# Patient Record
Sex: FEMALE | Race: WHITE | NOT HISPANIC OR LATINO | URBAN - METROPOLITAN AREA
[De-identification: names, ages, dates, MRNs, and addresses within clinical notes are randomized per-mention and may not be internally consistent; named-entity substitution may affect disease eponyms.]

---

## 2019-01-23 ENCOUNTER — EMERGENCY (EMERGENCY)
Facility: HOSPITAL | Age: 69
LOS: 1 days | Discharge: HOME | End: 2019-01-23
Attending: EMERGENCY MEDICINE
Payer: COMMERCIAL

## 2019-01-23 VITALS
HEART RATE: 76 BPM | TEMPERATURE: 98 F | WEIGHT: 115.08 LBS | DIASTOLIC BLOOD PRESSURE: 67 MMHG | SYSTOLIC BLOOD PRESSURE: 131 MMHG | OXYGEN SATURATION: 97 % | HEIGHT: 64 IN | RESPIRATION RATE: 20 BRPM

## 2019-01-23 PROCEDURE — 99283 EMERGENCY DEPT VISIT LOW MDM: CPT | Mod: 25

## 2019-01-23 PROCEDURE — 90715 TDAP VACCINE 7 YRS/> IM: CPT

## 2019-01-23 PROCEDURE — 90471 IMMUNIZATION ADMIN: CPT

## 2019-01-23 PROCEDURE — 12001 RPR S/N/AX/GEN/TRNK 2.5CM/<: CPT

## 2019-01-23 PROCEDURE — 99282 EMERGENCY DEPT VISIT SF MDM: CPT | Mod: 25

## 2019-01-23 RX ORDER — TETANUS TOXOID, REDUCED DIPHTHERIA TOXOID AND ACELLULAR PERTUSSIS VACCINE, ADSORBED 5; 2.5; 8; 8; 2.5 [IU]/.5ML; [IU]/.5ML; UG/.5ML; UG/.5ML; UG/.5ML
0.5 SUSPENSION INTRAMUSCULAR ONCE
Qty: 0 | Refills: 0 | Status: COMPLETED | OUTPATIENT
Start: 2019-01-23 | End: 2019-01-23

## 2019-01-23 RX ADMIN — TETANUS TOXOID, REDUCED DIPHTHERIA TOXOID AND ACELLULAR PERTUSSIS VACCINE, ADSORBED 0.5 MILLILITER(S): 5; 2.5; 8; 8; 2.5 SUSPENSION INTRAMUSCULAR at 21:10

## 2019-01-23 NOTE — ED PROVIDER NOTE - ATTENDING CONTRIBUTION TO CARE
seen and evaluated with acp  presents to ed for laceration  no loc, nausea, vomitng, neck pain  looks well, no distress, awake and alert  exam as documented  irrigate, simple closure  dt if not utd  f/u

## 2019-01-23 NOTE — ED PROVIDER NOTE - OBJECTIVE STATEMENT
67 Y/o female PMh of HLD present in Er S/p hit the ramos of the head as she was about stand up to the [place]org[place] of the TV , denies any headache- dizziness- visual changes - weakness- N/V - difficulty walking or any other compliant   denies taking any forms of blood thinner meds    unknown by pt when was the last tenus injection

## 2019-01-23 NOTE — ED PROVIDER NOTE - PHYSICAL EXAMINATION
scalp : left occipito - parietal area with 0.5cm la noted edge of the wound is open an bleeding on control- no depressed scalp  on palpating noted

## 2019-01-23 NOTE — ED ADULT NURSE NOTE - OBJECTIVE STATEMENT
pt A&Ox4 states that she hit head on corner of a TV pt denies loc, dizziness, blurred vision, n/v. blood thinners, pt has small lac to right side back of head with bleeding controlled

## 2022-12-22 ENCOUNTER — OFFICE (OUTPATIENT)
Dept: URBAN - METROPOLITAN AREA CLINIC 1 | Facility: CLINIC | Age: 72
Setting detail: OPHTHALMOLOGY
End: 2022-12-22
Payer: MEDICARE

## 2022-12-22 DIAGNOSIS — H02.831: ICD-10-CM

## 2022-12-22 DIAGNOSIS — H25.13: ICD-10-CM

## 2022-12-22 DIAGNOSIS — H43.813: ICD-10-CM

## 2022-12-22 DIAGNOSIS — H02.834: ICD-10-CM

## 2022-12-22 DIAGNOSIS — H11.153: ICD-10-CM

## 2022-12-22 DIAGNOSIS — H43.393: ICD-10-CM

## 2022-12-22 PROCEDURE — 92014 COMPRE OPH EXAM EST PT 1/>: CPT | Performed by: OPHTHALMOLOGY

## 2022-12-22 ASSESSMENT — AXIALLENGTH_DERIVED
OD_AL: 22.0022
OS_AL: 22.2544
OS_AL: 22.7869
OS_AL: 22.7869
OD_AL: 22.7921
OD_AL: 22.7921

## 2022-12-22 ASSESSMENT — REFRACTION_MANIFEST
OD_CYLINDER: -0.75
OD_SPHERE: +4.00
OS_CYLINDER: +0.75
OS_SPHERE: +3.50
OD_AXIS: 011
OD_ADD: +2.50
OS_ADD: +2.50
OD_CYLINDER: +0.75
OS_VA1: 20/20-1
OS_AXIS: 90
OD_VA1: 20/25
OS_CYLINDER: -1.25
OD_AXIS: 90
OU_VA: 20/20-1
OD_SPHERE: +2.50
OS_SPHERE: +3.00
OS_AXIS: 003

## 2022-12-22 ASSESSMENT — REFRACTION_CURRENTRX
OS_OVR_VA: 20/
OS_SPHERE: +2.75
OS_SPHERE: +5.25
OS_SPHERE: +2.75
OD_OVR_VA: 20/
OD_CYLINDER: -0.75
OS_AXIS: 90
OS_OVR_VA: 20/
OD_VPRISM_DIRECTION: SV
OD_CYLINDER: -0.75
OS_OVR_VA: 20/
OS_VPRISM_DIRECTION: SV
OD_VPRISM_DIRECTION: SV
OD_AXIS: 101
OD_SPHERE: +4.75
OS_AXIS: 85
OD_VPRISM_DIRECTION: SV
OS_CYLINDER: -1.25
OD_AXIS: 87
OS_CYLINDER: -1.25
OS_VPRISM_DIRECTION: SV
OS_AXIS: 80
OD_OVR_VA: 20/
OD_CYLINDER: -0.25
OD_AXIS: 99
OD_SPHERE: +2.00
OS_VPRISM_DIRECTION: SV
OS_CYLINDER: -1.25
OD_SPHERE: +2.25
OD_OVR_VA: 20/

## 2022-12-22 ASSESSMENT — REFRACTION_AUTOREFRACTION
OS_SPHERE: +3.00
OD_AXIS: 90
OS_AXIS: 90
OD_SPHERE: +2.50
OD_CYLINDER: -0.75
OS_CYLINDER: -1.25

## 2022-12-22 ASSESSMENT — KERATOMETRY
OD_K2POWER_DIOPTERS: 44.00
OS_AXISANGLE_DEGREES: 95
OS_K2POWER_DIOPTERS: 43.50
METHOD_AUTO_MANUAL: AUTO
OS_K1POWER_DIOPTERS: 43.00
OD_AXISANGLE_DEGREES: 77
OD_K1POWER_DIOPTERS: 43.00

## 2022-12-22 ASSESSMENT — VISUAL ACUITY
OD_BCVA: 20/25-1
OS_BCVA: 20/30-1

## 2022-12-22 ASSESSMENT — TONOMETRY
OS_IOP_MMHG: 15
OD_IOP_MMHG: 14

## 2022-12-22 ASSESSMENT — CONFRONTATIONAL VISUAL FIELD TEST (CVF)
OD_FINDINGS: FULL
OS_FINDINGS: FULL

## 2022-12-22 ASSESSMENT — LID POSITION - DERMATOCHALASIS
OS_DERMATOCHALASIS: LUL T
OD_DERMATOCHALASIS: RUL T

## 2022-12-22 ASSESSMENT — SPHEQUIV_DERIVED
OD_SPHEQUIV: 4.375
OD_SPHEQUIV: 2.125
OD_SPHEQUIV: 2.125
OS_SPHEQUIV: 3.875
OS_SPHEQUIV: 2.375
OS_SPHEQUIV: 2.375

## 2023-08-01 ENCOUNTER — APPOINTMENT (OUTPATIENT)
Dept: MAMMOGRAPHY | Facility: CLINIC | Age: 73
End: 2023-08-01
Payer: MEDICARE

## 2023-08-01 PROCEDURE — 77067 SCR MAMMO BI INCL CAD: CPT

## 2023-08-01 PROCEDURE — 77063 BREAST TOMOSYNTHESIS BI: CPT

## 2023-09-11 PROBLEM — Z00.00 ENCOUNTER FOR PREVENTIVE HEALTH EXAMINATION: Status: ACTIVE | Noted: 2023-09-11

## 2023-09-28 ENCOUNTER — OFFICE (OUTPATIENT)
Dept: URBAN - METROPOLITAN AREA CLINIC 1 | Facility: CLINIC | Age: 73
Setting detail: OPHTHALMOLOGY
End: 2023-09-28
Payer: MEDICARE

## 2023-09-28 DIAGNOSIS — H43.813: ICD-10-CM

## 2023-09-28 DIAGNOSIS — H02.831: ICD-10-CM

## 2023-09-28 DIAGNOSIS — H02.834: ICD-10-CM

## 2023-09-28 DIAGNOSIS — H53.9: ICD-10-CM

## 2023-09-28 DIAGNOSIS — H11.153: ICD-10-CM

## 2023-09-28 DIAGNOSIS — H35.363: ICD-10-CM

## 2023-09-28 DIAGNOSIS — H43.393: ICD-10-CM

## 2023-09-28 DIAGNOSIS — H52.4: ICD-10-CM

## 2023-09-28 DIAGNOSIS — H25.13: ICD-10-CM

## 2023-09-28 PROCEDURE — 99214 OFFICE O/P EST MOD 30 MIN: CPT | Performed by: OPHTHALMOLOGY

## 2023-09-28 PROCEDURE — 92015 DETERMINE REFRACTIVE STATE: CPT | Performed by: OPHTHALMOLOGY

## 2023-09-28 ASSESSMENT — REFRACTION_CURRENTRX
OD_VPRISM_DIRECTION: SV
OS_VPRISM_DIRECTION: SV
OD_CYLINDER: -0.75
OS_SPHERE: +5.25
OS_CYLINDER: -1.25
OD_CYLINDER: -0.25
OD_SPHERE: +2.00
OD_AXIS: 101
OS_VPRISM_DIRECTION: SV
OS_CYLINDER: -1.25
OS_VPRISM_DIRECTION: SV
OS_SPHERE: +2.75
OS_SPHERE: +2.75
OD_CYLINDER: -0.75
OD_SPHERE: +2.25
OS_AXIS: 90
OD_AXIS: 99
OS_OVR_VA: 20/
OD_OVR_VA: 20/
OS_CYLINDER: -1.25
OD_VPRISM_DIRECTION: SV
OD_VPRISM_DIRECTION: SV
OS_OVR_VA: 20/
OD_OVR_VA: 20/
OS_AXIS: 80
OS_AXIS: 85
OS_OVR_VA: 20/
OD_OVR_VA: 20/
OD_AXIS: 99
OD_SPHERE: +4.75

## 2023-09-28 ASSESSMENT — AXIALLENGTH_DERIVED
OD_AL: 22.7067
OD_AL: 22.7067
OS_AL: 22.6964
OS_AL: 22.6964
OS_AL: 22.2544
OD_AL: 21.9227

## 2023-09-28 ASSESSMENT — REFRACTION_AUTOREFRACTION
OD_SPHERE: +2.50
OD_AXIS: 81
OS_SPHERE: +3.25
OS_CYLINDER: -1.25
OS_AXIS: 88
OD_CYLINDER: -0.75

## 2023-09-28 ASSESSMENT — SPHEQUIV_DERIVED
OS_SPHEQUIV: 2.625
OD_SPHEQUIV: 4.375
OD_SPHEQUIV: 2.125
OS_SPHEQUIV: 2.625
OD_SPHEQUIV: 2.125
OS_SPHEQUIV: 3.875

## 2023-09-28 ASSESSMENT — KERATOMETRY
OD_K1POWER_DIOPTERS: 43.25
OD_K2POWER_DIOPTERS: 44.25
OS_K1POWER_DIOPTERS: 43.00
OS_AXISANGLE_DEGREES: 95
OD_AXISANGLE_DEGREES: 85
OS_K2POWER_DIOPTERS: 43.50
METHOD_AUTO_MANUAL: AUTO

## 2023-09-28 ASSESSMENT — REFRACTION_MANIFEST
OD_CYLINDER: +0.75
OS_ADD: +2.50
OD_ADD: +2.50
OD_CYLINDER: -0.75
OD_SPHERE: +2.50
OS_SPHERE: +3.25
OD_AXIS: 80
OS_CYLINDER: -1.25
OS_AXIS: 003
OD_VA1: 20/25-2
OD_AXIS: 011
OS_CYLINDER: +0.75
OS_SPHERE: +3.50
OS_AXIS: 90
OD_SPHERE: +4.00
OU_VA: 20/20-1
OS_VA1: 20/25-2

## 2023-09-28 ASSESSMENT — TONOMETRY
OS_IOP_MMHG: 15
OD_IOP_MMHG: 15

## 2023-09-28 ASSESSMENT — VISUAL ACUITY
OD_BCVA: 20/30
OS_BCVA: 20/30

## 2023-09-28 ASSESSMENT — CONFRONTATIONAL VISUAL FIELD TEST (CVF)
OD_FINDINGS: FULL
OS_FINDINGS: FULL

## 2023-09-28 ASSESSMENT — LID POSITION - DERMATOCHALASIS
OD_DERMATOCHALASIS: RUL T
OS_DERMATOCHALASIS: LUL T

## 2023-10-10 ENCOUNTER — OFFICE (OUTPATIENT)
Dept: URBAN - METROPOLITAN AREA CLINIC 115 | Facility: CLINIC | Age: 73
Setting detail: OPHTHALMOLOGY
End: 2023-10-10
Payer: MEDICARE

## 2023-10-10 DIAGNOSIS — H35.3121: ICD-10-CM

## 2023-10-10 DIAGNOSIS — H43.813: ICD-10-CM

## 2023-10-10 DIAGNOSIS — H35.3111: ICD-10-CM

## 2023-10-10 DIAGNOSIS — H43.393: ICD-10-CM

## 2023-10-10 PROCEDURE — 92134 CPTRZ OPH DX IMG PST SGM RTA: CPT | Performed by: OPHTHALMOLOGY

## 2023-10-10 PROCEDURE — 92201 OPSCPY EXTND RTA DRAW UNI/BI: CPT | Performed by: OPHTHALMOLOGY

## 2023-10-10 PROCEDURE — 99213 OFFICE O/P EST LOW 20 MIN: CPT | Performed by: OPHTHALMOLOGY

## 2023-10-10 ASSESSMENT — REFRACTION_AUTOREFRACTION
OS_SPHERE: +3.25
OS_AXIS: 88
OD_CYLINDER: -0.75
OS_CYLINDER: -1.25
OD_AXIS: 81
OD_SPHERE: +2.50

## 2023-10-10 ASSESSMENT — KERATOMETRY
OD_K1POWER_DIOPTERS: 43.25
OS_K1POWER_DIOPTERS: 43.00
OS_K2POWER_DIOPTERS: 43.50
OD_K2POWER_DIOPTERS: 44.25
METHOD_AUTO_MANUAL: AUTO
OD_AXISANGLE_DEGREES: 85
OS_AXISANGLE_DEGREES: 95

## 2023-10-10 ASSESSMENT — REFRACTION_CURRENTRX
OS_SPHERE: +5.25
OS_AXIS: 85
OS_AXIS: 90
OD_VPRISM_DIRECTION: SV
OS_CYLINDER: -1.25
OD_SPHERE: +2.25
OD_OVR_VA: 20/
OS_SPHERE: +2.75
OS_OVR_VA: 20/
OS_CYLINDER: -1.25
OS_OVR_VA: 20/
OD_AXIS: 101
OS_VPRISM_DIRECTION: SV
OD_VPRISM_DIRECTION: SV
OD_VPRISM_DIRECTION: SV
OS_VPRISM_DIRECTION: SV
OD_OVR_VA: 20/
OS_CYLINDER: -1.25
OD_AXIS: 99
OD_OVR_VA: 20/
OD_CYLINDER: -0.75
OS_SPHERE: +2.75
OD_CYLINDER: -0.25
OS_AXIS: 80
OD_SPHERE: +4.75
OD_SPHERE: +2.00
OS_OVR_VA: 20/
OS_VPRISM_DIRECTION: SV
OD_CYLINDER: -0.75
OD_AXIS: 99

## 2023-10-10 ASSESSMENT — LID POSITION - DERMATOCHALASIS
OD_DERMATOCHALASIS: RUL T
OS_DERMATOCHALASIS: LUL T

## 2023-10-10 ASSESSMENT — AXIALLENGTH_DERIVED
OS_AL: 22.6964
OD_AL: 22.7067

## 2023-10-10 ASSESSMENT — SPHEQUIV_DERIVED
OS_SPHEQUIV: 2.625
OD_SPHEQUIV: 2.125

## 2023-10-10 ASSESSMENT — CONFRONTATIONAL VISUAL FIELD TEST (CVF)
OD_FINDINGS: FULL
OS_FINDINGS: FULL

## 2023-10-10 ASSESSMENT — TONOMETRY
OS_IOP_MMHG: 16
OD_IOP_MMHG: 15

## 2023-10-10 ASSESSMENT — VISUAL ACUITY
OS_BCVA: 20/30
OD_BCVA: 20/30

## 2024-01-11 ENCOUNTER — OFFICE (OUTPATIENT)
Dept: URBAN - METROPOLITAN AREA CLINIC 115 | Facility: CLINIC | Age: 74
Setting detail: OPHTHALMOLOGY
End: 2024-01-11
Payer: MEDICARE

## 2024-01-11 DIAGNOSIS — H43.393: ICD-10-CM

## 2024-01-11 DIAGNOSIS — H35.3121: ICD-10-CM

## 2024-01-11 DIAGNOSIS — H43.813: ICD-10-CM

## 2024-01-11 DIAGNOSIS — H35.3111: ICD-10-CM

## 2024-01-11 PROCEDURE — 99213 OFFICE O/P EST LOW 20 MIN: CPT | Performed by: OPHTHALMOLOGY

## 2024-01-11 PROCEDURE — 92134 CPTRZ OPH DX IMG PST SGM RTA: CPT | Performed by: OPHTHALMOLOGY

## 2024-01-11 ASSESSMENT — REFRACTION_CURRENTRX
OS_AXIS: 85
OD_OVR_VA: 20/
OS_AXIS: 80
OS_AXIS: 90
OS_VPRISM_DIRECTION: SV
OD_VPRISM_DIRECTION: SV
OD_CYLINDER: -0.75
OD_SPHERE: +2.25
OD_OVR_VA: 20/
OS_CYLINDER: -1.25
OD_OVR_VA: 20/
OS_SPHERE: +2.75
OS_VPRISM_DIRECTION: SV
OD_VPRISM_DIRECTION: SV
OS_CYLINDER: -1.25
OD_SPHERE: +2.00
OS_SPHERE: +5.25
OD_AXIS: 99
OS_OVR_VA: 20/
OS_OVR_VA: 20/
OD_CYLINDER: -0.25
OD_CYLINDER: -0.75
OS_VPRISM_DIRECTION: SV
OS_CYLINDER: -1.25
OS_SPHERE: +2.75
OD_SPHERE: +4.75
OD_AXIS: 99
OD_AXIS: 101
OS_OVR_VA: 20/
OD_VPRISM_DIRECTION: SV

## 2024-01-11 ASSESSMENT — REFRACTION_AUTOREFRACTION
OS_SPHERE: +3.25
OD_SPHERE: +2.50
OD_CYLINDER: -0.75
OS_AXIS: 88
OS_CYLINDER: -1.25
OD_AXIS: 81

## 2024-01-11 ASSESSMENT — CONFRONTATIONAL VISUAL FIELD TEST (CVF)
OS_FINDINGS: FULL
OD_FINDINGS: FULL

## 2024-01-11 ASSESSMENT — SPHEQUIV_DERIVED
OD_SPHEQUIV: 2.125
OS_SPHEQUIV: 2.625

## 2024-01-11 ASSESSMENT — LID POSITION - DERMATOCHALASIS
OS_DERMATOCHALASIS: LUL T
OD_DERMATOCHALASIS: RUL T

## 2024-03-18 ENCOUNTER — APPOINTMENT (OUTPATIENT)
Dept: ORTHOPEDIC SURGERY | Facility: CLINIC | Age: 74
End: 2024-03-18
Payer: MEDICARE

## 2024-03-18 VITALS — HEIGHT: 64 IN | BODY MASS INDEX: 18.1 KG/M2 | WEIGHT: 106 LBS

## 2024-03-18 DIAGNOSIS — E78.00 PURE HYPERCHOLESTEROLEMIA, UNSPECIFIED: ICD-10-CM

## 2024-03-18 DIAGNOSIS — Z87.891 PERSONAL HISTORY OF NICOTINE DEPENDENCE: ICD-10-CM

## 2024-03-18 DIAGNOSIS — M81.0 AGE-RELATED OSTEOPOROSIS W/OUT CURRENT PATHOLOGICAL FRACTURE: ICD-10-CM

## 2024-03-18 PROCEDURE — 99205 OFFICE O/P NEW HI 60 MIN: CPT | Mod: 25

## 2024-03-18 PROCEDURE — L3670: CPT | Mod: RT

## 2024-03-18 RX ORDER — ATORVASTATIN CALCIUM 80 MG/1
TABLET, FILM COATED ORAL
Refills: 0 | Status: ACTIVE | COMMUNITY

## 2024-03-18 RX ORDER — ALENDRONATE SODIUM 35 MG/1
TABLET ORAL
Refills: 0 | Status: ACTIVE | COMMUNITY

## 2024-03-18 NOTE — IMAGING
[de-identified] : The patient is a well appearing 73 year  old female of their stated age. Neck is supple & nontender to palpation. Negative Spurling's test.   Effected Shoulder: RIGHT  Inspection: Scapula Winging: Negative Deformity: None Erythema: None Ecchymosis: None Abrasions: None Effusion: None   Range of Motion: Active Forward Flexion: 180 degrees Active Abduction: 180 degrees Passive Forward Flexion: 180 degrees Passive Abduction: 180 degrees ER @ 90 degrees: 90 degrees IR @ 90 degrees: 30 degrees ER @ 0 degrees: 50 degrees Motor Exam: Forward Flexion: 4 out of 5 Flexion Plane of Scapula: 4 out of 5 Abduction: 4 out of 5 Internal Rotation: 5- out of 5 External Rotation: 4+ out of 5 Distal Motor Strength: 5 out of 5   Stability Testing: Anterior: 1+ Posterior: 1+ Sulcus N: 1+ Sulcus ER: 1+ Provocative Tests: Drop Arm: Negative Impingement: POSITIVE  [place]: POSITIVE  X-Arm Adduction: Negative Belly Press: Negative Bear Hug: Negative Lift Off: Negative Apprehension: Negative Relocation: Negative Posterior Load & Shift: Negative   Palpation: AC Joint: Nontender Clavicle: Nontender SC Joint: Nontender Bicepital Groove: Nontender Coracoid Process: Nontender Pectoralis Minor Tendon: Nontender Pectoralis Major Tendon: Nontender & palpably intact Latissimus Dorsi: Nontender Proximal Humerus: Nontender Scapula Body: Nontender Medial Scapula Boarder: Nontender Scapula Spine: Nontender   Neurologic Exam: Sensation to Light Touch: Axillary: Grossly intact Ulnar: Grossly intact Radial: Grossly intact Median: Grossly intact Other:  N/A Circulatory/Pulses: Ulnar: 2+ Radial: 2+ Other Pertinent Findings: None   Contralateral Shoulder Range of Motion: Active Forward Flexion: 180 degrees Active Abduction: 180 degrees Passive Forward Flexion: 180 degrees Passive Abduction: 180 degrees ER @ 90 degrees: 90 degrees IR @ 90 degrees: 45 degrees ER @ 0 degrees: 50 degrees Motor Exam: Forward Flexion: 5 out of 5 Flexion Plane of Scapula: 5 out of 5 Abduction: 5 out of 5 Internal Rotation: 5 out of 5 External Rotation: 5 out of 5 Distal Motor Strength: 5 out of 5 Stability Testing: Anterior: 1+ Posterior: 1+ Sulcus N: 1+ Sulcus ER: 1+ Other Pertinent Findings: None   Assessment: The patient is a 73 year old female with right shoulder pain and radiographic and physical exam findings consistent with rotator cuff tear involving supraspinatus tendon.  The patients condition is acute Documents/Results Reviewed Today: MRI right shoulder  Tests/Studies Independently Interpreted Today: MRI right shoulder reveals evidence of complete anterior supraspinatus tendon tearing.  Pertinent findings include: +impingement, +[place]'s, 4/5 FF, FSP, and FF, 4+/5 ER, 5-/5 IR Confounding medical conditions/concerns: Osteoporosis history significantly increases risk of rotator cuff repair failure and hardware failure.  Patient will continue treatment for osteoporosis and continue with vitamin D.   Plan: Discussed operative vs non-operative treatment options including right shoulder arthroscopic rotator cuff repair, biceps tenodesis vs tenotomy, subacromial decompression, acromioplasty, and any indicated procedures. All questions and concerns regarding the surgery were addressed. Went over the recovery timeline and expected outcomes following surgery. The patient continues to be symptomatic and has failed conservative treatment, electing to move forward with surgical intervention.  Tests Ordered: Pre-op  Prescription Medications Ordered: None  Braces/DME Ordered: UltraSling and cold therapy unit  Activity/Work/Sports Status: None Additional Instructions: None Follow-Up: 2 weeks post-op   The patient's current medication management of their orthopedic diagnosis was reviewed today: (1) We discussed a comprehensive treatment plan that included possible pharmaceutical management involving the use of prescription strength medications including but not limited to options such as oral Naprosyn 500mg BID, once daily Meloxicam 15 mg, or 500-650 mg Tylenol versus over the counter oral medications and topical prescription NSAID Pennsaid vs over the counter Voltaren gel.  Based on our extensive discussion, the patient declined prescription medication and will use over the counter Advil, Alleve, Voltaren Gel or Tylenol as directed. (2) There is a moderate risk of morbidity with further treatment, especially from use of prescription strength medications and possible side effects of these medications which include upset stomach with oral medications, skin reactions to topical medications and cardiac/renal issues with long term use. (3) I recommended that the patient follow-up with their medical physician to discuss any significant specific potential issues with long term medication use such as interactions with current medications or with exacerbation of underlying medical comorbidities. (4) The benefits and risks associated with use of injectable, oral or topical, prescription and over the counter anti-inflammatory medications were discussed with the patient. The patient voiced understanding of the risks including but not limited to bleeding, stroke, kidney dysfunction, heart disease, and were referred to the black box warning label for further information.   Consent:  Conservative treatment, nontreatment, nonsurgical intervention and surgical intervention treatment options have been reviewed with the patient.  The patient continues to be symptomatic and has failed conservative treatment, and elects to move forward with surgical intervention.  The patient is indicated for right shoulder arthroscopic rotator cuff repair, biceps tenotomy vs tenodesis, subacromial decompression, acromioplasty and all indicated procedures. As such the alternatives, benefits and risks, of the above procedure, including but not limited to bleeding, infection, neurovascular injury, loss of limb, loss of life,  DVT, PE, RSD, inability to return to previous level of activity, inability to return to previous level of employment, advancement of or to osteoarthritic changes, joint instability or motion loss, hardware failure or migration, biceps pain cramping weakness or deformity, failure to resolve all symptoms, failure to return to sports and need for further procedures, as well as specific risk of need for future joint arthroplasty were discussed with the patient and/or their legal guardian who agreed to move forward with surgical intervention.  They have reviewed and signed the consent form today after expressing understanding of the above documented conversation. The patient or their representative will contact my office as instructed on the preoperative instruction sheet they received today to schedule surgery in a timely manner as discussed. Over 25 minutes were spent on this encounter including time with the patient and over 15 minutes spent on counseling and coordination of care.    ILuisa attest that this documentation has been prepared under the direction and in the presence of Provider Dr. Maninder Li.   The documentation recorded by the scribe accurately reflects the services IDr. Maninder, personally performed and the decisions made by me.

## 2024-03-18 NOTE — HISTORY OF PRESENT ILLNESS
[de-identified] : The patient is a 73 year  old R hand dominant female who presents today complaining of R shoulder pain.   Date of Injury/Onset: 2/15/24 Pain:    At Rest: 0/10  With Activity:  10/10  Mechanism of injury: started having pain after shoveling snow This is NOT a Work Related Injury being treated under Worker's Compensation. This is NOT an athletic injury occurring associated with an interscholastic or organized sports team. Quality of symptoms: intermittent sharp pain Improves with: rest Worse with: pulling Prior treatment: PCP Prior Imaging: MRI Out of work/sport: not currently working School/Sport/Position/Occupation: retired Additional Information: None

## 2024-03-18 NOTE — DATA REVIEWED
[MRI] : MRI [Right] : of the right [Shoulder] : shoulder [Report was reviewed and noted in the chart] : The report was reviewed and noted in the chart [I independently reviewed and interpreted images and report] : I independently reviewed and interpreted images and report [I reviewed the films/CD and additionally noted] : I reviewed the films/CD and additionally noted [FreeTextEntry1] : MRI right shoulder reveals evidence of complete anterior supraspinatus tendon tearing.

## 2024-03-22 RX ORDER — HYDROCODONE BITARTRATE AND ACETAMINOPHEN 7.5; 325 MG/1; MG/1
7.5-325 TABLET ORAL
Qty: 30 | Refills: 0 | Status: ACTIVE | COMMUNITY
Start: 2024-03-22 | End: 1900-01-01

## 2024-03-22 RX ORDER — ONDANSETRON 4 MG/1
4 TABLET ORAL
Qty: 15 | Refills: 0 | Status: ACTIVE | COMMUNITY
Start: 2024-03-22 | End: 1900-01-01

## 2024-03-22 RX ORDER — DOCUSATE SODIUM 100 MG/1
100 CAPSULE ORAL 3 TIMES DAILY
Qty: 21 | Refills: 0 | Status: ACTIVE | COMMUNITY
Start: 2024-03-22 | End: 1900-01-01

## 2024-03-26 ENCOUNTER — APPOINTMENT (OUTPATIENT)
Dept: ORTHOPEDIC SURGERY | Facility: AMBULATORY SURGERY CENTER | Age: 74
End: 2024-03-26
Payer: MEDICARE

## 2024-03-26 PROCEDURE — 29828 SHO ARTHRS SRG BICP TENODSIS: CPT | Mod: 59,RT

## 2024-03-26 PROCEDURE — 29827 SHO ARTHRS SRG RT8TR CUF RPR: CPT | Mod: 22,RT

## 2024-03-26 PROCEDURE — 29827 SHO ARTHRS SRG RT8TR CUF RPR: CPT | Mod: AS,22,RT

## 2024-03-26 PROCEDURE — 29826 SHO ARTHRS SRG DECOMPRESSION: CPT | Mod: AS,RT

## 2024-03-26 PROCEDURE — 29828 SHO ARTHRS SRG BICP TENODSIS: CPT | Mod: AS,59,RT

## 2024-03-26 PROCEDURE — 29823 SHO ARTHRS SRG XTNSV DBRDMT: CPT | Mod: 59,RT

## 2024-03-26 PROCEDURE — 29826 SHO ARTHRS SRG DECOMPRESSION: CPT | Mod: RT

## 2024-03-26 PROCEDURE — 29823 SHO ARTHRS SRG XTNSV DBRDMT: CPT | Mod: AS,59,RT

## 2024-04-11 ENCOUNTER — APPOINTMENT (OUTPATIENT)
Dept: ORTHOPEDIC SURGERY | Facility: CLINIC | Age: 74
End: 2024-04-11
Payer: MEDICARE

## 2024-04-11 VITALS — HEIGHT: 64 IN | BODY MASS INDEX: 18.1 KG/M2 | WEIGHT: 106 LBS

## 2024-04-11 PROCEDURE — 99024 POSTOP FOLLOW-UP VISIT: CPT

## 2024-04-11 NOTE — HISTORY OF PRESENT ILLNESS
[de-identified] : The patient is s/p right shoulder arthroscopic subscap RCR, supraspinatus RCR, biceps tenodesis in loop and tack fashion to the subscap anchor, ant, post, sup, and inf labral debridement, rotator interval debridement, SAD with extensive bursectomy, coracoacromial ligament recession and acromioplasty  Date of Surgery: 3/26/24 Pain:    At Rest: 0/10  With Activity:  0/10  Mechanism of injury: started having pain after shoveling snow This is NOT a Work Related Injury being treated under Worker's Compensation. This is NOT an athletic injury occurring associated with an interscholastic or organized sports team. Treatment/Imaging/Studies Since Last Visit: surgery, PT 3-2x/week, HEP 	Reports Available For Review Today: op report, op pics Out of work/sport: not currently working School/Sport/Position/Occupation: retired Change since last visit: surgery, PT, denies fever/chills/nausea after surgery. Doing well postoperatively. Complaint with the sling.  Additional Information: None

## 2024-04-11 NOTE — PHYSICAL EXAM
[de-identified] : The patient is a well appearing 73 year old female of their stated age. Patient presents in ultrasling. Negative straight leg raise bilateral.   Surgical site: right shoulder   Incision sites: Well approximated, clean, dry, intact, without drainage, without erythema, resolving ecchymosis down bicep.   Range of motion: 90/90   Motor Testing: Rotator cuff muscles firing   Stability Testing: Limited by pain   Swelling/Effusion: None   Tenderness to palpation: None   Provocative testing: Limited by pain   Right Calf: soft and nontender Left Calf: soft and nontender   Neurovascular Examination: Grossly intact, 2+ distal pulses Contralateral Extremity: Examination grossly benign     Assessment & Plan: The patient is approximately 2 weeks s/p right shoulder arthroscopic subscap RCR, supraspinatus RCR, biceps tenodesis in loop and tack fashion to the subscap anchor, ant, post, sup, and inf labral debridement, rotator interval debridement, SAD with extensive bursectomy, coracoacromial ligament recession and acromioplasty(DOS: 3/26/24). Sutures removed and Steri Strips applied today. The patient is instructed on wound management. The patient's post-op plan, protocol and activity modifications have been thoroughly discussed and the patient expressed understanding. Patient will continue use of sling for 2 weeks with pillow, there after she can discontinue pillow. Continue physical therapy. The patient will control pain as discussed & continue ice and elevation as needed. The patient otherwise may advance activity as discussed. Follow up 4 weeks.  The patient's current medication management of their orthopedic diagnosis was reviewed today: (1) The patient will continue with the PRN use of their prescribed anti-inflammatory. (2) There is a moderate risk of morbidity with further treatment, especially from use of prescription strength medications and possible side effects of these medications which include upset stomach with oral medications, skin reactions to topical medications and cardiac/renal issues with long term use. (3) I recommended that the patient follow-up with their medical physician to discuss any significant specific potential issues with long term medication use such as interactions with current medications or with exacerbation of underlying medical comorbidities. (4) The benefits and risks associated with use of injectable, oral or topical, prescription and over the counter anti-inflammatory medications were discussed with the patient. The patient voiced understanding of the risks including but not limited to bleeding, stroke, kidney dysfunction, heart disease, and were referred to the black box warning label for further information.  ILary  attest that this documentation has been prepared under the direction and in the presence of Provider Dr. Maninder Li.  The documentation recorded by the scribe accurately reflects the service Cmofort THOMAS PA-C, personally performed and the decisions made by me. The patient was seen by me under the direct supervision of Dr. Maninder Li.

## 2024-05-07 ENCOUNTER — APPOINTMENT (OUTPATIENT)
Dept: ORTHOPEDIC SURGERY | Facility: CLINIC | Age: 74
End: 2024-05-07
Payer: MEDICARE

## 2024-05-07 VITALS — HEIGHT: 64 IN | BODY MASS INDEX: 18.1 KG/M2 | WEIGHT: 106 LBS

## 2024-05-07 PROCEDURE — 99024 POSTOP FOLLOW-UP VISIT: CPT

## 2024-05-07 NOTE — HISTORY OF PRESENT ILLNESS
[de-identified] : The patient is s/p right shoulder arthroscopic subscap RCR, supraspinatus RCR, biceps tenodesis in loop and tack fashion to the subscap anchor, ant, post, sup, and inf labral debridement, rotator interval debridement, SAD with extensive bursectomy, coracoacromial ligament recession and acromioplasty  Date of Surgery: 3/26/24 Pain:    At Rest: 0/10  With Activity:  0/10  Mechanism of injury: started having pain after shoveling snow This is NOT a Work Related Injury being treated under Worker's Compensation. This is NOT an athletic injury occurring associated with an interscholastic or organized sports team. Treatment/Imaging/Studies Since Last Visit: surgery, PT 3-2x/week, HEP 	Reports Available For Review Today: op report, op pics Out of work/sport: not currently working School/Sport/Position/Occupation: retired Change since last visit: surgery, PT, denies fever/chills/nausea after surgery. Doing well postoperatively. Complaint with the sling.  Additional Information: None

## 2024-05-07 NOTE — PHYSICAL EXAM
[Normal Coordination] : normal coordination [Normal DTR UE/LE] : normal DTR UE/LE  [Normal Sensation] : normal sensation [Normal Mood and Affect] : normal mood and affect [Orientated] : orientated [Normal Skin] : normal skin [No Rash] : no rash [No Ulcers] : no ulcers [No Lesions] : no lesions [No obvious lymphadenopathy in areas examined] : no obvious lymphadenopathy in areas examined [de-identified] : The patient is a well appearing 73 year old female of their stated age. Patient presents in ULTRASLING without bump  Negative straight leg raise bilateral.   Surgical site: Right shoulder   Incision sites: Well approximated, clean, dry, intact, without drainage, without erythema, resolving ecchymosis down bicep.   Range of motion: 120/120   Motor Testing: Rotator cuff muscles firing, 5-/5 internal rotation, 4-/5 all other planes   Stability Testing: Stable ligamentous exam   Swelling/Effusion: None   Tenderness to palpation: None   Provocative testing: Negative   Right Calf: soft and nontender Left Calf: soft and nontender   Neurovascular Examination: Grossly intact, 2+ distal pulses Contralateral Extremity: Examination grossly benign     Assessment & Plan: The patient is approximately 6 weeks s/p right shoulder arthroscopic subscapularis RCR, supraspinatus RCR, biceps tenodesis in loop and tack fashion to the subscapularis anchor, ant, post, sup, and inf labral debridement, rotator interval debridement, SAD with extensive bursectomy, coracoacromial ligament recession and acromioplasty (DOS: 3/26/24). The patient's post-op plan, protocol and activity modifications have been thoroughly discussed and the patient expressed understanding. The patient may gradually discontinue sling for activities of daily living. Recommended the patient wear as precautionary measure in large crowds, unstable environments, and inclement weather. Continue physical therapy, HEP, and stretching. The patient will control pain as discussed & continue ice and elevation as needed. The patient otherwise may advance activity as discussed. Follow up 6 weeks.  The patient's current medication management of their orthopedic diagnosis was reviewed today:  (1) We discussed a comprehensive treatment plan that included possible pharmaceutical management involving the use of prescription strength medications including but not limited to options such as oral Naprosyn 500mg BID, once daily Meloxicam 15 mg, or 500-650 mg Tylenol versus over the counter oral medications and topical prescription NSAID Pennsaid vs over the counter Voltaren gel. Based on our extensive discussion, the patient declined prescription medication and will use over the counter Advil, Alleve, Voltaren Gel or Tylenol as directed.  (2) There is a moderate risk of morbidity with further treatment, especially from use of prescription strength medications and possible side effects of these medications which include upset stomach with oral medications, skin reactions to topical medications and cardiac/renal issues with long term use.  (3) I recommended that the patient follow-up with their medical physician to discuss any significant specific potential issues with long term medication use such as interactions with current medications or with exacerbation of underlying medical comorbidities.  (4) The benefits and risks associated with use of injectable, oral or topical, prescription and over the counter anti-inflammatory medications were discussed with the patient. The patient voiced understanding of the risks including but not limited to bleeding, stroke, kidney dysfunction, heart disease, and were referred to the black box warning label for further information.  Lary THOMAS attest that this documentation has been prepared under the direction and in the presence of Comfort Worrell PA-C.  The documentation accurately reflects the service Comfort THOMAS PA-C, personally performed and the decisions made by me.

## 2024-06-27 ENCOUNTER — APPOINTMENT (OUTPATIENT)
Dept: ORTHOPEDIC SURGERY | Facility: CLINIC | Age: 74
End: 2024-06-27

## 2024-06-27 VITALS — HEIGHT: 64 IN | BODY MASS INDEX: 18.1 KG/M2 | WEIGHT: 106 LBS

## 2024-06-27 DIAGNOSIS — M75.101 UNSPECIFIED ROTATOR CUFF TEAR OR RUPTURE OF RIGHT SHOULDER, NOT SPECIFIED AS TRAUMATIC: ICD-10-CM

## 2024-06-27 PROCEDURE — 99213 OFFICE O/P EST LOW 20 MIN: CPT

## 2024-07-29 ENCOUNTER — OFFICE (OUTPATIENT)
Dept: URBAN - METROPOLITAN AREA CLINIC 1 | Facility: CLINIC | Age: 74
Setting detail: OPHTHALMOLOGY
End: 2024-07-29
Payer: MEDICARE

## 2024-07-29 DIAGNOSIS — H35.3131: ICD-10-CM

## 2024-07-29 DIAGNOSIS — H11.153: ICD-10-CM

## 2024-07-29 DIAGNOSIS — H43.393: ICD-10-CM

## 2024-07-29 DIAGNOSIS — H25.13: ICD-10-CM

## 2024-07-29 DIAGNOSIS — H43.813: ICD-10-CM

## 2024-07-29 DIAGNOSIS — H02.834: ICD-10-CM

## 2024-07-29 DIAGNOSIS — H02.831: ICD-10-CM

## 2024-07-29 PROCEDURE — 92014 COMPRE OPH EXAM EST PT 1/>: CPT | Performed by: OPHTHALMOLOGY

## 2024-07-29 PROCEDURE — 92250 FUNDUS PHOTOGRAPHY W/I&R: CPT | Performed by: OPHTHALMOLOGY

## 2024-07-29 ASSESSMENT — CONFRONTATIONAL VISUAL FIELD TEST (CVF)
OS_FINDINGS: FULL
OD_FINDINGS: FULL

## 2024-07-29 ASSESSMENT — LID POSITION - DERMATOCHALASIS
OS_DERMATOCHALASIS: LUL T
OD_DERMATOCHALASIS: RUL T

## 2024-08-01 ENCOUNTER — APPOINTMENT (OUTPATIENT)
Dept: ORTHOPEDIC SURGERY | Facility: CLINIC | Age: 74
End: 2024-08-01

## 2024-08-01 VITALS — WEIGHT: 106 LBS | BODY MASS INDEX: 18.1 KG/M2 | HEIGHT: 64 IN

## 2024-08-01 DIAGNOSIS — M75.101 UNSPECIFIED ROTATOR CUFF TEAR OR RUPTURE OF RIGHT SHOULDER, NOT SPECIFIED AS TRAUMATIC: ICD-10-CM

## 2024-08-01 PROCEDURE — 99213 OFFICE O/P EST LOW 20 MIN: CPT

## 2024-08-01 NOTE — IMAGING
[de-identified] : The patient is a well appearing 73 year old female of their stated age. Negative spurling bilateral.    Surgical site: Right shoulder   Incision sites: Well healed.   Range of motion:  160/160/85/10/40   Motor Testin/5 ABD, FSP, and ER, 4+/5 FF & IR    Stability Testing: Stable ligamentous exam   Swelling/Effusion: None   Tenderness to palpation: None   Provocative testing: Negative   Right Calf: soft and nontender Left Calf: soft and nontender   Neurovascular Examination: Grossly intact, 2+ distal pulses Contralateral Extremity: Examination grossly benign     Assessment & Plan: The patient is approximately 4.5 months s/p right shoulder arthroscopic subscapularis RCR, supraspinatus RCR, biceps tenodesis in loop and tack fashion to the subscapularis anchor, ant, post, sup, and inf labral debridement, rotator interval debridement, SAD with extensive bursectomy, coracoacromial ligament recession and acromioplasty (DOS: 3/26/24). The patient's post-op plan, protocol and activity modifications have been thoroughly discussed and the patient expressed understanding. Continue physical therapy, HEP, and stretching- working on ROM. The patient may gradually increase activity avoiding fatigue. The patient will control pain as discussed & continue ice and elevation as needed. The patient otherwise may advance activity as discussed. The patient will follow up on a PRN basis unless new symptoms arise.  The patient's current medication management of their orthopedic diagnosis was reviewed today: (1) We discussed a comprehensive treatment plan that included possible pharmaceutical management involving the use of prescription strength medications including but not limited to options such as oral Naprosyn 500mg BID, once daily Meloxicam 15 mg, or 500-650 mg Tylenol versus over the counter oral medications and topical prescription NSAID Pennsaid vs over the counter Voltaren gel.  Based on our extensive discussion, the patient declined prescription medication and will use over the counter Advil, Alleve, Voltaren Gel or Tylenol as directed. (2) There is a moderate risk of morbidity with further treatment, especially from use of prescription strength medications and possible side effects of these medications which include upset stomach with oral medications, skin reactions to topical medications and cardiac/renal issues with long term use. (3) I recommended that the patient follow-up with their medical physician to discuss any significant specific potential issues with long term medication use such as interactions with current medications or with exacerbation of underlying medical comorbidities. (4) The benefits and risks associated with use of injectable, oral or topical, prescription and over the counter anti-inflammatory medications were discussed with the patient. The patient voiced understanding of the risks including but not limited to bleeding, stroke, kidney dysfunction, heart disease, and were referred to the black box warning label for further information.   ILary attest that this documentation has been prepared under the direction and in the presence of Provider Dr. Maninder Li.  The documentation recorded by the scribe accurately reflects the services Dr. Maninder THOMAS, personally performed and the decisions made by me.

## 2024-08-01 NOTE — HISTORY OF PRESENT ILLNESS
[de-identified] : The patient is a 74 year old female following up for her right shoulder.  Date of Surgery: 3/26/24 Pain:    At Rest: 0/10  With Activity:  0/10  Mechanism of injury: started having pain after shoveling snow This is NOT a Work Related Injury being treated under Worker's Compensation. This is NOT an athletic injury occurring associated with an interscholastic or organized sports team. Quality of symptoms: random pinching  Improves with: rest  Worse with: random movements  Treatment/Imaging/Studies Since Last Visit: HEP 	Reports Available For Review Today:  Out of work/sport: not currently working School/Sport/Position/Occupation: retired Change since last visit: Feeling much better. Feels some random intermittent pinching but feeling great otherwise. States her ROM and strength have improved since last visit.  Additional Information: s/p right shoulder arthroscopic subscap RCR, supraspinatus RCR, biceps tenodesis in loop and tack fashion to the subscap anchor, ant, post, sup, and inf labral debridement, rotator interval debridement, SAD with extensive bursectomy, coracoacromial ligament recession and acromioplasty

## 2024-08-01 NOTE — IMAGING
[de-identified] : The patient is a well appearing 73 year old female of their stated age. Negative spurling bilateral.    Surgical site: Right shoulder   Incision sites: Well healed.   Range of motion:  160/160/85/10/40   Motor Testin/5 ABD, FSP, and ER, 4+/5 FF & IR    Stability Testing: Stable ligamentous exam   Swelling/Effusion: None   Tenderness to palpation: None   Provocative testing: Negative   Right Calf: soft and nontender Left Calf: soft and nontender   Neurovascular Examination: Grossly intact, 2+ distal pulses Contralateral Extremity: Examination grossly benign     Assessment & Plan: The patient is approximately 4.5 months s/p right shoulder arthroscopic subscapularis RCR, supraspinatus RCR, biceps tenodesis in loop and tack fashion to the subscapularis anchor, ant, post, sup, and inf labral debridement, rotator interval debridement, SAD with extensive bursectomy, coracoacromial ligament recession and acromioplasty (DOS: 3/26/24). The patient's post-op plan, protocol and activity modifications have been thoroughly discussed and the patient expressed understanding. Continue physical therapy, HEP, and stretching- working on ROM. The patient may gradually increase activity avoiding fatigue. The patient will control pain as discussed & continue ice and elevation as needed. The patient otherwise may advance activity as discussed. The patient will follow up on a PRN basis unless new symptoms arise.  The patient's current medication management of their orthopedic diagnosis was reviewed today: (1) We discussed a comprehensive treatment plan that included possible pharmaceutical management involving the use of prescription strength medications including but not limited to options such as oral Naprosyn 500mg BID, once daily Meloxicam 15 mg, or 500-650 mg Tylenol versus over the counter oral medications and topical prescription NSAID Pennsaid vs over the counter Voltaren gel.  Based on our extensive discussion, the patient declined prescription medication and will use over the counter Advil, Alleve, Voltaren Gel or Tylenol as directed. (2) There is a moderate risk of morbidity with further treatment, especially from use of prescription strength medications and possible side effects of these medications which include upset stomach with oral medications, skin reactions to topical medications and cardiac/renal issues with long term use. (3) I recommended that the patient follow-up with their medical physician to discuss any significant specific potential issues with long term medication use such as interactions with current medications or with exacerbation of underlying medical comorbidities. (4) The benefits and risks associated with use of injectable, oral or topical, prescription and over the counter anti-inflammatory medications were discussed with the patient. The patient voiced understanding of the risks including but not limited to bleeding, stroke, kidney dysfunction, heart disease, and were referred to the black box warning label for further information.   ILary attest that this documentation has been prepared under the direction and in the presence of Provider Dr. Maninder Li.  The documentation recorded by the scribe accurately reflects the services Dr. Maninder THOMAS, personally performed and the decisions made by me.

## 2024-08-01 NOTE — HISTORY OF PRESENT ILLNESS
[de-identified] : The patient is a 74 year old female following up for her right shoulder.  Date of Surgery: 3/26/24 Pain:    At Rest: 0/10  With Activity:  0/10  Mechanism of injury: started having pain after shoveling snow This is NOT a Work Related Injury being treated under Worker's Compensation. This is NOT an athletic injury occurring associated with an interscholastic or organized sports team. Quality of symptoms: random pinching  Improves with: rest  Worse with: random movements  Treatment/Imaging/Studies Since Last Visit: HEP 	Reports Available For Review Today:  Out of work/sport: not currently working School/Sport/Position/Occupation: retired Change since last visit: Feeling much better. Feels some random intermittent pinching but feeling great otherwise. States her ROM and strength have improved since last visit.  Additional Information: s/p right shoulder arthroscopic subscap RCR, supraspinatus RCR, biceps tenodesis in loop and tack fashion to the subscap anchor, ant, post, sup, and inf labral debridement, rotator interval debridement, SAD with extensive bursectomy, coracoacromial ligament recession and acromioplasty

## 2024-08-10 ENCOUNTER — NON-APPOINTMENT (OUTPATIENT)
Age: 74
End: 2024-08-10

## 2025-03-26 ENCOUNTER — NON-APPOINTMENT (OUTPATIENT)
Age: 75
End: 2025-03-26

## 2025-07-28 ENCOUNTER — OFFICE (OUTPATIENT)
Dept: URBAN - METROPOLITAN AREA CLINIC 1 | Facility: CLINIC | Age: 75
Setting detail: OPHTHALMOLOGY
End: 2025-07-28
Payer: MEDICARE

## 2025-07-28 DIAGNOSIS — H11.153: ICD-10-CM

## 2025-07-28 DIAGNOSIS — H02.831: ICD-10-CM

## 2025-07-28 DIAGNOSIS — H43.393: ICD-10-CM

## 2025-07-28 DIAGNOSIS — H02.834: ICD-10-CM

## 2025-07-28 DIAGNOSIS — H35.3131: ICD-10-CM

## 2025-07-28 DIAGNOSIS — H25.13: ICD-10-CM

## 2025-07-28 DIAGNOSIS — H43.813: ICD-10-CM

## 2025-07-28 PROCEDURE — 92014 COMPRE OPH EXAM EST PT 1/>: CPT | Performed by: OPHTHALMOLOGY

## 2025-07-28 PROCEDURE — 92250 FUNDUS PHOTOGRAPHY W/I&R: CPT | Performed by: OPHTHALMOLOGY

## 2025-07-28 ASSESSMENT — TONOMETRY
OD_IOP_MMHG: 19
OS_IOP_MMHG: 18

## 2025-07-28 ASSESSMENT — REFRACTION_CURRENTRX
OS_VPRISM_DIRECTION: SV
OS_OVR_VA: 20/
OS_VPRISM_DIRECTION: SV
OD_SPHERE: +2.25
OD_OVR_VA: 20/
OS_CYLINDER: -1.25
OS_CYLINDER: -1.25
OD_AXIS: 100
OS_AXIS: 90
OD_AXIS: 066
OD_CYLINDER: -0.75
OD_VPRISM_DIRECTION: SV
OS_AXIS: 092
OS_AXIS: 094
OD_SPHERE: +2.00
OS_SPHERE: +2.75
OS_SPHERE: +2.75
OD_CYLINDER: -0.50
OD_AXIS: 99
OD_SPHERE: +5.00
OD_VPRISM_DIRECTION: SV
OS_OVR_VA: 20/
OS_VPRISM_DIRECTION: SV
OD_OVR_VA: 20/
OS_CYLINDER: -1.25
OD_OVR_VA: 20/
OS_OVR_VA: 20/
OD_CYLINDER: -0.75
OD_VPRISM_DIRECTION: SV
OS_SPHERE: +5.75

## 2025-07-28 ASSESSMENT — REFRACTION_MANIFEST
OD_SPHERE: +2.00
OD_VA1: 20/25-2
OS_AXIS: 085
OS_CYLINDER: -1.50
OD_CYLINDER: -0.25
OS_VA1: 20/25-2
OD_AXIS: 070
OS_SPHERE: +3.00

## 2025-07-28 ASSESSMENT — KERATOMETRY
OD_K2POWER_DIOPTERS: 44.25
METHOD_AUTO_MANUAL: AUTO
OS_K2POWER_DIOPTERS: 43.50
OS_K1POWER_DIOPTERS: 43.25
OS_AXISANGLE_DEGREES: 096
OD_AXISANGLE_DEGREES: 084
OD_K1POWER_DIOPTERS: 43.00

## 2025-07-28 ASSESSMENT — REFRACTION_AUTOREFRACTION
OS_CYLINDER: -1.50
OS_SPHERE: +3.00
OD_AXIS: 070
OS_AXIS: 085
OD_CYLINDER: -0.25
OD_SPHERE: +2.00

## 2025-07-28 ASSESSMENT — CONFRONTATIONAL VISUAL FIELD TEST (CVF)
OD_FINDINGS: FULL
OS_FINDINGS: FULL

## 2025-07-28 ASSESSMENT — LID POSITION - DERMATOCHALASIS
OS_DERMATOCHALASIS: LUL T
OD_DERMATOCHALASIS: RUL T

## 2025-07-28 ASSESSMENT — VISUAL ACUITY
OS_BCVA: 20/80-1
OD_BCVA: 20/70-1